# Patient Record
Sex: MALE | Race: WHITE | Employment: UNEMPLOYED | URBAN - METROPOLITAN AREA
[De-identification: names, ages, dates, MRNs, and addresses within clinical notes are randomized per-mention and may not be internally consistent; named-entity substitution may affect disease eponyms.]

---

## 2017-01-10 ENCOUNTER — OFFICE VISIT (OUTPATIENT)
Dept: ORTHOPEDIC SURGERY | Age: 42
End: 2017-01-10

## 2017-01-10 VITALS
WEIGHT: 210 LBS | SYSTOLIC BLOOD PRESSURE: 107 MMHG | HEIGHT: 73 IN | DIASTOLIC BLOOD PRESSURE: 61 MMHG | BODY MASS INDEX: 27.83 KG/M2 | HEART RATE: 87 BPM

## 2017-01-10 DIAGNOSIS — M25.562 LEFT KNEE PAIN, UNSPECIFIED CHRONICITY: Primary | ICD-10-CM

## 2017-01-10 DIAGNOSIS — M23.42 LOOSE BODY IN KNEE, LEFT: ICD-10-CM

## 2017-01-10 PROBLEM — M23.40 LOOSE BODY IN KNEE: Status: ACTIVE | Noted: 2017-01-10

## 2017-01-10 PROCEDURE — 73562 X-RAY EXAM OF KNEE 3: CPT | Performed by: ORTHOPAEDIC SURGERY

## 2017-01-10 PROCEDURE — 99213 OFFICE O/P EST LOW 20 MIN: CPT | Performed by: ORTHOPAEDIC SURGERY

## 2017-01-10 RX ORDER — MORPHINE 10 MG/ML
TINCTURE ORAL
COMMUNITY
Start: 2017-01-08 | End: 2017-04-24 | Stop reason: ALTCHOICE

## 2017-01-10 RX ORDER — OXYCODONE HYDROCHLORIDE 5 MG/1
10 TABLET ORAL EVERY 8 HOURS PRN
COMMUNITY
Start: 2016-12-14

## 2017-01-10 RX ORDER — LOPERAMIDE HYDROCHLORIDE 2 MG/1
CAPSULE ORAL
COMMUNITY
Start: 2016-12-21 | End: 2017-04-24 | Stop reason: ALTCHOICE

## 2017-01-10 RX ORDER — DIPHENOXYLATE HYDROCHLORIDE AND ATROPINE SULFATE 2.5; .025 MG/1; MG/1
TABLET ORAL PRN
COMMUNITY
Start: 2016-12-11 | End: 2019-08-06 | Stop reason: CLARIF

## 2017-01-10 RX ORDER — CODEINE SULFATE 30 MG/1
TABLET ORAL
COMMUNITY
Start: 2016-12-24 | End: 2017-04-24 | Stop reason: ALTCHOICE

## 2017-03-23 ENCOUNTER — OFFICE VISIT (OUTPATIENT)
Dept: ORTHOPEDIC SURGERY | Age: 42
End: 2017-03-23

## 2017-03-23 VITALS
DIASTOLIC BLOOD PRESSURE: 64 MMHG | BODY MASS INDEX: 27.17 KG/M2 | WEIGHT: 205 LBS | HEIGHT: 73 IN | SYSTOLIC BLOOD PRESSURE: 94 MMHG | HEART RATE: 69 BPM

## 2017-03-23 DIAGNOSIS — M23.42 LOOSE BODY IN KNEE, LEFT: Primary | ICD-10-CM

## 2017-03-23 DIAGNOSIS — M25.562 LEFT KNEE PAIN, UNSPECIFIED CHRONICITY: ICD-10-CM

## 2017-03-23 PROCEDURE — 1036F TOBACCO NON-USER: CPT | Performed by: ORTHOPAEDIC SURGERY

## 2017-03-23 PROCEDURE — G8484 FLU IMMUNIZE NO ADMIN: HCPCS | Performed by: ORTHOPAEDIC SURGERY

## 2017-03-23 PROCEDURE — 99213 OFFICE O/P EST LOW 20 MIN: CPT | Performed by: ORTHOPAEDIC SURGERY

## 2017-03-23 PROCEDURE — G8419 CALC BMI OUT NRM PARAM NOF/U: HCPCS | Performed by: ORTHOPAEDIC SURGERY

## 2017-03-23 PROCEDURE — G8428 CUR MEDS NOT DOCUMENT: HCPCS | Performed by: ORTHOPAEDIC SURGERY

## 2017-03-24 DIAGNOSIS — M23.42 LOOSE BODY OF LEFT KNEE: Primary | ICD-10-CM

## 2017-03-27 ENCOUNTER — TELEPHONE (OUTPATIENT)
Dept: ORTHOPEDIC SURGERY | Age: 42
End: 2017-03-27

## 2017-04-06 ENCOUNTER — HOSPITAL ENCOUNTER (OUTPATIENT)
Dept: PREADMISSION TESTING | Age: 42
Discharge: HOME | End: 2017-04-29
Attending: ORTHOPAEDIC SURGERY | Admitting: ORTHOPAEDIC SURGERY

## 2017-04-11 ENCOUNTER — TELEPHONE (OUTPATIENT)
Dept: ORTHOPEDIC SURGERY | Age: 42
End: 2017-04-11

## 2017-04-24 ENCOUNTER — HOSPITAL ENCOUNTER (OUTPATIENT)
Dept: PREADMISSION TESTING | Age: 42
Discharge: HOME | End: 2017-04-24
Attending: ORTHOPAEDIC SURGERY | Admitting: ORTHOPAEDIC SURGERY

## 2017-04-24 VITALS — BODY MASS INDEX: 26.51 KG/M2 | WEIGHT: 200 LBS | HEIGHT: 73 IN

## 2017-04-24 RX ORDER — CLINDAMYCIN PHOSPHATE 900 MG/50ML
900 INJECTION INTRAVENOUS ONCE
Status: CANCELLED | OUTPATIENT
Start: 2017-04-24 | End: 2017-04-24

## 2017-04-24 RX ORDER — CHLORHEXIDINE GLUCONATE 0.12 MG/ML
15 RINSE ORAL 2 TIMES DAILY
Status: CANCELLED | OUTPATIENT
Start: 2017-04-24

## 2017-04-27 ENCOUNTER — OFFICE VISIT (OUTPATIENT)
Dept: ORTHOPEDIC SURGERY | Age: 42
End: 2017-04-27

## 2017-04-27 ENCOUNTER — HOSPITAL ENCOUNTER (OUTPATIENT)
Dept: PHYSICAL THERAPY | Age: 42
Discharge: HOME | End: 2017-04-30
Admitting: ORTHOPAEDIC SURGERY

## 2017-04-27 VITALS
SYSTOLIC BLOOD PRESSURE: 95 MMHG | HEART RATE: 75 BPM | WEIGHT: 200 LBS | DIASTOLIC BLOOD PRESSURE: 68 MMHG | HEIGHT: 73 IN | BODY MASS INDEX: 26.51 KG/M2

## 2017-04-27 DIAGNOSIS — M23.42 LOOSE BODY IN KNEE, LEFT: ICD-10-CM

## 2017-04-27 DIAGNOSIS — M25.562 LEFT KNEE PAIN, UNSPECIFIED CHRONICITY: Primary | ICD-10-CM

## 2017-04-27 PROCEDURE — PREOPEXAM PRE-OP EXAM: Performed by: ORTHOPAEDIC SURGERY

## 2017-04-27 PROCEDURE — MISC250 COMPRESSION STOCKING: Performed by: ORTHOPAEDIC SURGERY

## 2017-05-01 ENCOUNTER — HOSPITAL ENCOUNTER (OUTPATIENT)
Dept: SURGERY | Age: 42
Discharge: HOME | End: 2017-05-01
Admitting: ORTHOPAEDIC SURGERY

## 2017-05-01 VITALS
SYSTOLIC BLOOD PRESSURE: 114 MMHG | DIASTOLIC BLOOD PRESSURE: 70 MMHG | HEIGHT: 73 IN | HEART RATE: 100 BPM | TEMPERATURE: 98.3 F | OXYGEN SATURATION: 94 % | BODY MASS INDEX: 26.51 KG/M2 | WEIGHT: 200 LBS | RESPIRATION RATE: 18 BRPM

## 2017-05-01 RX ORDER — ONDANSETRON 2 MG/ML
4 INJECTION INTRAMUSCULAR; INTRAVENOUS
Status: ACTIVE | OUTPATIENT
Start: 2017-05-01 | End: 2017-05-01

## 2017-05-01 RX ORDER — OXYCODONE HYDROCHLORIDE AND ACETAMINOPHEN 5; 325 MG/1; MG/1
1 TABLET ORAL ONCE
Status: DISCONTINUED | OUTPATIENT
Start: 2017-05-01 | End: 2017-05-02 | Stop reason: HOSPADM

## 2017-05-01 RX ORDER — SODIUM CHLORIDE, SODIUM LACTATE, POTASSIUM CHLORIDE, CALCIUM CHLORIDE 600; 310; 30; 20 MG/100ML; MG/100ML; MG/100ML; MG/100ML
INJECTION, SOLUTION INTRAVENOUS CONTINUOUS
Status: DISCONTINUED | OUTPATIENT
Start: 2017-05-01 | End: 2017-05-02 | Stop reason: HOSPADM

## 2017-05-01 RX ORDER — HYDRALAZINE HYDROCHLORIDE 20 MG/ML
5 INJECTION INTRAMUSCULAR; INTRAVENOUS EVERY 10 MIN PRN
Status: DISCONTINUED | OUTPATIENT
Start: 2017-05-01 | End: 2017-05-02 | Stop reason: HOSPADM

## 2017-05-01 RX ORDER — CLINDAMYCIN PHOSPHATE 900 MG/50ML
900 INJECTION INTRAVENOUS ONCE
Status: COMPLETED | OUTPATIENT
Start: 2017-05-01 | End: 2017-05-01

## 2017-05-01 RX ORDER — FENTANYL CITRATE 50 UG/ML
25 INJECTION, SOLUTION INTRAMUSCULAR; INTRAVENOUS EVERY 5 MIN PRN
Status: DISCONTINUED | OUTPATIENT
Start: 2017-05-01 | End: 2017-05-02 | Stop reason: HOSPADM

## 2017-05-01 RX ORDER — GLYCOPYRROLATE 0.2 MG/ML
0.1 INJECTION INTRAMUSCULAR; INTRAVENOUS ONCE
Status: COMPLETED | OUTPATIENT
Start: 2017-05-01 | End: 2017-05-01

## 2017-05-01 RX ORDER — MEPERIDINE HYDROCHLORIDE 25 MG/ML
12.5 INJECTION INTRAMUSCULAR; INTRAVENOUS; SUBCUTANEOUS EVERY 5 MIN PRN
Status: DISCONTINUED | OUTPATIENT
Start: 2017-05-01 | End: 2017-05-02 | Stop reason: HOSPADM

## 2017-05-01 RX ORDER — CHLORHEXIDINE GLUCONATE 0.12 MG/ML
15 RINSE ORAL 2 TIMES DAILY
Status: DISCONTINUED | OUTPATIENT
Start: 2017-05-01 | End: 2017-05-02 | Stop reason: HOSPADM

## 2017-05-01 RX ORDER — DIPHENHYDRAMINE HYDROCHLORIDE 50 MG/ML
12.5 INJECTION INTRAMUSCULAR; INTRAVENOUS
Status: ACTIVE | OUTPATIENT
Start: 2017-05-01 | End: 2017-05-01

## 2017-05-01 RX ORDER — LABETALOL HYDROCHLORIDE 5 MG/ML
5 INJECTION, SOLUTION INTRAVENOUS EVERY 10 MIN PRN
Status: DISCONTINUED | OUTPATIENT
Start: 2017-05-01 | End: 2017-05-02 | Stop reason: HOSPADM

## 2017-05-01 RX ADMIN — Medication 0.5 MG: at 09:20

## 2017-05-01 RX ADMIN — CLINDAMYCIN PHOSPHATE 900 MG: 900 INJECTION INTRAVENOUS at 07:35

## 2017-05-01 RX ADMIN — Medication 0.5 MG: at 10:05

## 2017-05-01 RX ADMIN — Medication 0.5 MG: at 09:40

## 2017-05-01 RX ADMIN — SODIUM CHLORIDE, SODIUM LACTATE, POTASSIUM CHLORIDE, CALCIUM CHLORIDE: 600; 310; 30; 20 INJECTION, SOLUTION INTRAVENOUS at 07:09

## 2017-05-01 RX ADMIN — Medication 0.5 MG: at 09:50

## 2017-05-01 RX ADMIN — Medication 0.5 MG: at 09:30

## 2017-05-01 RX ADMIN — FENTANYL CITRATE 25 MCG: 50 INJECTION, SOLUTION INTRAMUSCULAR; INTRAVENOUS at 10:00

## 2017-05-01 RX ADMIN — GLYCOPYRROLATE 0.1 MG: 0.2 INJECTION INTRAMUSCULAR; INTRAVENOUS at 07:16

## 2017-05-01 ASSESSMENT — PAIN DESCRIPTION - ORIENTATION
ORIENTATION: LEFT

## 2017-05-01 ASSESSMENT — PAIN DESCRIPTION - LOCATION
LOCATION: KNEE

## 2017-05-01 ASSESSMENT — PAIN SCALES - GENERAL
PAINLEVEL_OUTOF10: 4
PAINLEVEL_OUTOF10: 7
PAINLEVEL_OUTOF10: 6
PAINLEVEL_OUTOF10: 7
PAINLEVEL_OUTOF10: 6
PAINLEVEL_OUTOF10: 6
PAINLEVEL_OUTOF10: 7

## 2017-05-01 ASSESSMENT — PAIN DESCRIPTION - PROGRESSION
CLINICAL_PROGRESSION: GRADUALLY IMPROVING
CLINICAL_PROGRESSION: NOT CHANGED
CLINICAL_PROGRESSION: GRADUALLY IMPROVING

## 2017-05-01 ASSESSMENT — PAIN DESCRIPTION - PAIN TYPE
TYPE: SURGICAL PAIN

## 2017-05-01 ASSESSMENT — PAIN DESCRIPTION - DESCRIPTORS
DESCRIPTORS: PRESSURE
DESCRIPTORS: PRESSURE
DESCRIPTORS: SHARP
DESCRIPTORS: THROBBING

## 2017-05-01 ASSESSMENT — PAIN - FUNCTIONAL ASSESSMENT: PAIN_FUNCTIONAL_ASSESSMENT: 0-10

## 2017-05-02 ENCOUNTER — HOSPITAL ENCOUNTER (OUTPATIENT)
Dept: PHYSICAL THERAPY | Age: 42
Discharge: HOME | End: 2017-05-02
Admitting: ORTHOPAEDIC SURGERY

## 2017-05-08 ENCOUNTER — HOSPITAL ENCOUNTER (OUTPATIENT)
Dept: PHYSICAL THERAPY | Age: 42
Discharge: HOME | End: 2017-05-08
Admitting: ORTHOPAEDIC SURGERY

## 2017-05-10 ENCOUNTER — HOSPITAL ENCOUNTER (OUTPATIENT)
Dept: PHYSICAL THERAPY | Age: 42
Discharge: HOME | End: 2017-05-10
Admitting: ORTHOPAEDIC SURGERY

## 2017-05-25 ENCOUNTER — HOSPITAL ENCOUNTER (OUTPATIENT)
Dept: PHYSICAL THERAPY | Age: 42
Discharge: HOME | End: 2017-05-25
Admitting: ORTHOPAEDIC SURGERY

## 2017-06-01 ENCOUNTER — HOSPITAL ENCOUNTER (OUTPATIENT)
Dept: PHYSICAL THERAPY | Age: 42
Discharge: HOME | End: 2017-06-01
Admitting: ORTHOPAEDIC SURGERY

## 2017-08-29 ENCOUNTER — HOSPITAL ENCOUNTER (OUTPATIENT)
Dept: PHYSICAL THERAPY | Age: 42
Discharge: HOME | End: 2017-08-31
Admitting: ORTHOPAEDIC SURGERY

## 2018-01-25 ENCOUNTER — OFFICE VISIT (OUTPATIENT)
Dept: ORTHOPEDIC SURGERY | Age: 43
End: 2018-01-25

## 2018-01-25 VITALS
DIASTOLIC BLOOD PRESSURE: 71 MMHG | SYSTOLIC BLOOD PRESSURE: 98 MMHG | BODY MASS INDEX: 26.5 KG/M2 | WEIGHT: 199.96 LBS | HEART RATE: 98 BPM | HEIGHT: 73 IN

## 2018-01-25 DIAGNOSIS — M17.12 PRIMARY OSTEOARTHRITIS OF LEFT KNEE: ICD-10-CM

## 2018-01-25 DIAGNOSIS — M25.562 LEFT KNEE PAIN, UNSPECIFIED CHRONICITY: Primary | ICD-10-CM

## 2018-01-25 DIAGNOSIS — Z96.651 HISTORY OF TOTAL KNEE ARTHROPLASTY, RIGHT: ICD-10-CM

## 2018-01-25 PROCEDURE — G8427 DOCREV CUR MEDS BY ELIG CLIN: HCPCS | Performed by: ORTHOPAEDIC SURGERY

## 2018-01-25 PROCEDURE — G8419 CALC BMI OUT NRM PARAM NOF/U: HCPCS | Performed by: ORTHOPAEDIC SURGERY

## 2018-01-25 PROCEDURE — G8484 FLU IMMUNIZE NO ADMIN: HCPCS | Performed by: ORTHOPAEDIC SURGERY

## 2018-01-25 PROCEDURE — 1036F TOBACCO NON-USER: CPT | Performed by: ORTHOPAEDIC SURGERY

## 2018-01-25 PROCEDURE — 99213 OFFICE O/P EST LOW 20 MIN: CPT | Performed by: ORTHOPAEDIC SURGERY

## 2018-01-25 NOTE — PROGRESS NOTES
family history on file. Social History     Social History    Marital status: Single     Spouse name: N/A    Number of children: N/A    Years of education: N/A     Social History Main Topics    Smoking status: Never Smoker    Smokeless tobacco: Never Used    Alcohol use No    Drug use: No    Sexual activity: Not Asked     Other Topics Concern    None     Social History Narrative    None       Current Outpatient Prescriptions   Medication Sig Dispense Refill    oxyCODONE (ROXICODONE) 5 MG immediate release tablet 10 mg every 8 hours as needed  .  Pantoprazole Sodium (PROTONIX PO) Take 20 mg by mouth every 7 days       sucralfate (CARAFATE) 1 GM tablet daily       levothyroxine (SYNTHROID) 25 MCG tablet 25 mcg daily       dicyclomine (BENTYL) 20 MG tablet Take 20 mg by mouth every 6 hours.  ondansetron (ZOFRAN) 4 MG tablet Take 4 mg by mouth every 8 hours as needed.  cyanocobalamin 1000 MCG/ML injection Inject  into the muscle every 30 days.  citalopram (CELEXA) 20 MG tablet Take 20 mg by mouth daily.  diphenoxylate-atropine (LOMOTIL) 2.5-0.025 MG per tablet as needed       Buprenorphine (BUTRANS TD) Place 10 mcg onto the skin every 7 days        No current facility-administered medications for this visit. Allergies   Allergen Reactions    Remicade [Infliximab Injection] Shortness Of Breath    Biaxin [Clarithromycin] Itching    Cephalexin Itching    Darvocet [Propoxyphene N-Acetaminophen] Itching    Droperidol      Causes panick attacks    Levofloxacin Itching    Reglan [Metoclopramide] Itching       Review of Systems:  A 14 point review of systems was completed by the patient and is available in the media section of the scanned medical record and was reviewed on 1/25/2018.   The review is negative with the exception of those things mentioned in the HPI and Past Medical History     Vital Signs:   BP 98/71   Pulse 98   Ht 6' 0.99\" (1.854 m)   Wt 199 lb 15.3 oz

## 2018-01-29 ENCOUNTER — HOSPITAL ENCOUNTER (OUTPATIENT)
Dept: PHYSICAL THERAPY | Age: 43
Discharge: HOME | End: 2018-01-31
Admitting: ORTHOPAEDIC SURGERY

## 2018-01-29 DIAGNOSIS — M25.562 LEFT KNEE PAIN, UNSPECIFIED CHRONICITY: Primary | ICD-10-CM

## 2018-01-29 NOTE — FLOWSHEET NOTE
[facility] [place] [facility]  Orthopaedics and Sports Rehabilitation, Fariba Lawson    Physical Therapy Daily Treatment Note  Date:  2018    Patient Name:  Audrey Osborne    :  1975  MRN: [id number]  Restrictions/Precautions:    Medical/Treatment Diagnosis Information:  Diagnosis: M25.562 (ICD-10-CM) - Left knee pain, unspecified chronicity  Treatment Diagnosis: Patient presents s/s consistent with MD diagnosis. Patient presents with decreased hamstring, ITB and quadriceps flexibility; decreased hip ABd and extensor strength; instability within the R knee; impaired gait; decreased functional mobility. Insurance/Certification information:  PT Insurance Information: PT BENEFITS 2018 FACILITY/ MEDICARE PRIMARY/ 2,010 CAP FOR 2018/ [place] MEDICAID SECONDARY INS/ 18 PAG  Physician Information:  Referring Practitioner: Sherry Rojo  Plan of care signed (Y/N): Pending     Date of Patient follow up with Physician:     G-Code (if applicable):      Date G-Code Applied:  18  PT G-Codes  Functional Assessment Tool Used: LEFS  Score: 30% Deficit  Functional Limitation: Mobility: Walking and moving around  Mobility: Walking and Moving Around Current Status (): At least 20 percent but less than 40 percent impaired, limited or restricted  Mobility: Walking and Moving Around Goal Status ():  At least 1 percent but less than 20 percent impaired, limited or restricted    Progress Note: [x]  Yes  []  No  Next due by: Visit #10       Latex Allergy:  [x]NO      []YES  Preferred Language for Healthcare:   [x]English       []other:    Visit # Insurance Allowable   1 $2010 cap     Pain level:  3-7/10     SUBJECTIVE:  See eval    OBJECTIVE:       Flexibility L R Comment   Hamstring -20 deg -30 deg 90/90   Gastroc NT NT     ITB Mild Mod Rafat's   Quad Mild Mild Ely's                           ROM PROM AROM Overpressure Comment     L R L R L R     Flexion     135 130         Extension     0 0                               x 10 Blue        PRE     Extension  RANGE:   Flexion  RANGE:        Quantum machines     Leg press      Leg extension     Leg curl          Manual interventions                     Therapeutic Exercise and NMR EXR  [x] (79909) Provided verbal/tactile cueing for activities related to strengthening, flexibility, endurance, ROM for improvements in LE, proximal hip, and core control with self care, mobility, lifting, ambulation. [x] (12917) Provided verbal/tactile cueing for activities related to improving balance, coordination, kinesthetic sense, posture, motor skill, proprioception  to assist with LE, proximal hip, and core control in self care, mobility, lifting, ambulation and eccentric single leg control.      NMR and Therapeutic Activities:    [] (04419 or 62926) Provided verbal/tactile cueing for activities related to improving balance, coordination, kinesthetic sense, posture, motor skill, proprioception and motor activation to allow for proper function of core, proximal hip and LE with self care and ADLs  [] (97143) Gait Re-education- Provided training and instruction to the patient for proper LE, core and proximal hip recruitment and positioning and eccentric body weight control with ambulation re-education including up and down stairs     Home Exercise Program:    [x] (33785) Reviewed/Progressed HEP activities related to strengthening, flexibility, endurance, ROM of core, proximal hip and LE for functional self-care, mobility, lifting and ambulation/stair navigation   [] (78734)Reviewed/Progressed HEP activities related to improving balance, coordination, kinesthetic sense, posture, motor skill, proprioception of core, proximal hip and LE for self care, mobility, lifting, and ambulation/stair navigation      Manual Treatments:  PROM / STM / Oscillations-Mobs:  G-I, II, III, IV (PA's, Inf., Post.)  [] (48480) Provided manual therapy to mobilize LE, proximal hip and/or LS spine soft tissue/joints for the purpose of modulating pain, promoting relaxation,  increasing ROM, reducing/eliminating soft tissue swelling/inflammation/restriction, improving soft tissue extensibility and allowing for proper ROM for normal function with self care, mobility, lifting and ambulation. Modalities:  Declined ice    Charges:  Timed Code Treatment Minutes: 39'   Total Treatment Minutes: 76'       [x] EVAL (LOW) 30277 (typically 20 minutes face-to-face)  [] EVAL (MOD) 66634 (typically 30 minutes face-to-face)  [] EVAL (HIGH) 58527 (typically 45 minutes face-to-face)  [] RE-EVAL   [x] RL(86179) x  1   [] IONTO  [x] NMR (50581) x  1   [] VASO  [] Manual (12325) x       [] Other:  [x] TA x  1    [] Mech Traction (88275)  [] ES(attended) (47498)      [] ES (un) (12393):     GOALS:  Patient stated goal: Patient would like to return to improve endurance and overall strength     Therapist goals for Patient:   Short Term Goals: To be achieved in: 2 weeks  1. Independent in HEP and progression per patient tolerance, in order to prevent re-injury. 2. Patient will have a decrease in pain to facilitate improvement in movement, function, and ADLs as indicated by Functional Deficits.     Long Term Goals: To be achieved in: 12 weeks  1. Disability index score of 15% or less for the LEFS to assist with reaching prior level of function. 2. Patient will demonstrate increased knee flexion AROM to 135 to allow for proper joint functioning as indicated by patients Functional Deficits. 3. Patient will demonstrate an increase in Strength to good proximal hip strength and control in LE to allow for proper functional mobility as indicated by patients Functional Deficits. 4. Patient will return to household functional activities without increased symptoms or restriction. 5. Patient will walk for 20 minutes without pain in the R knee. Progression Towards Functional goals:  [] Patient is progressing as expected towards functional goals listed. [] Progression is slowed due to complexities listed. [] Progression has been slowed due to co-morbidities. [x] Plan just implemented, too soon to assess goals progression  [] Other:     ASSESSMENT:  See eval    Treatment/Activity Tolerance:  [x] Patient tolerated treatment well [] Patient limited by fatique  [] Patient limited by pain  [] Patient limited by other medical complications  [] Other:     Patient education:  1/29/18: Implemented HEP    Prognosis: [] Good [x] Fair  [] Poor    Patient Requires Follow-up: [x] Yes  [] No    PLAN: See eval  [] Continue per plan of care [] Alter current plan (see comments)  [x] Plan of care initiated [] Hold pending MD visit [] Discharge    Electronically signed by: Allie Shipman PT, DPT  Therapist was present, directed the patient's care, made skilled judgement, and was responsible for assessment and treatment of the patient.    Suri Puentes, SPT

## 2018-01-29 NOTE — PLAN OF CARE
[facility]                                                       Physical Therapy Certification    Dear Referring Practitioner: Nae Cloud,    We had the pleasure of evaluating the following patient for physical therapy services at [address] [place] [address]. A summary of our findings can be found in the initial assessment below. This includes our plan of care. If you have any questions or concerns regarding these findings, please do not hesitate to contact me at the office phone number checked above. Thank you for the referral.       Physician Signature:_______________________________Date:__________________  By signing above (or electronic signature), therapists plan is approved by physician      Patient: Jeanmarie Tena   : 1975   MRN: [id number]  Referring Physician: Referring Practitioner: Nae Cloud      Evaluation Date: 2018      Medical Diagnosis Information:  Diagnosis: M25.562 (ICD-10-CM) - Left knee pain, unspecified chronicity   Treatment Diagnosis: Patient presents s/s consistent with MD diagnosis. Patient presents with decreased hamstring, ITB and quadriceps flexibility; decreased hip ABd and extensor strength; instability within the R knee; impaired gait; decreased functional mobility. Insurance information: PT Insurance Information: PT BENEFITS 2018 FACILITY/ MEDICARE PRIMARY/ 2,010 CAP FOR 2018/ [place] MEDICAID SECONDARY INS/ 18 PAG     Precautions/ Contra-indications:   Latex Allergy:  [x]NO      []YES  Preferred Language for Healthcare:   [x]English       []other:    SUBJECTIVE: Patient had a TKR in his R knee, in . Patient has recently received multiple scopes, for debridement within his L knee. He received therapy earlier in  following his last procedure, at this PT office.  However he was unable to hypomobility   []Decreased LE functional ROM   [x]Decreased core/proximal hip strength and neuromuscular control   [x]Decreased LE functional strength   [x]Reduced balance/proprioceptive control   []other:      Functional Activity Limitations (from functional questionnaire and intake)   [x]Reduced ability to tolerate prolonged functional positions   []Reduced ability or difficulty with changes of positions or transfers between positions   [x]Reduced ability to maintain good posture and demonstrate good body mechanics with sitting, bending, and lifting   []Reduced ability to sleep   [] Reduced ability or tolerance with driving and/or computer work   [x]Reduced ability to perform lifting, carrying tasks   [x]Reduced ability to squat   [x]Reduced ability to forward bend   [x]Reduced ability to ambulate prolonged functional periods/distances/surfaces   [x]Reduced ability to ascend/descend stairs   [x]Reduced ability to run, hop or jump   []other:     Participation Restrictions   [x]Reduced participation in self care activities   [x]Reduced participation in home management activities   []Reduced participation in work activities   []Reduced participation in social activities. [x]Reduced participation in sport activities. Classification :    [x]Signs/symptoms consistent with post-surgical status including decreased ROM, strength and function.    []Signs/symptoms consistent with joint sprain/strain   []Signs/symptoms consistent with patella-femoral syndrome   []Signs/symptoms consistent with knee OA/hip OA   []Signs/symptoms consistent with internal derangement of knee/Hip   []Signs/symptoms consistent with functional hip weakness/NMR control      []Signs/symptoms consistent with tendinitis/tendinosis    []signs/symptoms consistent with pathology which may benefit from Dry needling      []other:      Prognosis/Rehab Potential:      []Excellent   []Good    [x]Fair   []Poor    Tolerance of evaluation/treatment: []Excellent   []Good    [x]Fair   []Poor    Physical Therapy Evaluation Complexity Justification  [x] A history of present problem with:  [] no personal factors and/or comorbidities that impact the plan of care;  [x]1-2 personal factors and/or comorbidities that impact the plan of care  []3 personal factors and/or comorbidities that impact the plan of care  [x] An examination of body systems using standardized tests and measures addressing any of the following: body structures and functions (impairments), activity limitations, and/or participation restrictions;:  [] a total of 1-2 or more elements   [x] a total of 3 or more elements   [] a total of 4 or more elements   [x] A clinical presentation with:  [x] stable and/or uncomplicated characteristics   [] evolving clinical presentation with changing characteristics  [] unstable and unpredictable characteristics;   [x] Clinical decision making of [x] low, [] moderate, [] high complexity using standardized patient assessment instrument and/or measurable assessment of functional outcome. [x] EVAL (LOW) 67650 (typically 20 minutes face-to-face)  [] EVAL (MOD) 49950 (typically 30 minutes face-to-face)  [] EVAL (HIGH) 96329 (typically 45 minutes face-to-face)  [] RE-EVAL       PLAN  Frequency/Duration:  2 days per week for 12 Weeks:  Interventions:  [x]  Therapeutic exercise including: strength training, ROM, for Lower extremity and core   [x]  NMR activation and proprioception for LE, Glutes and Core   [x]  Manual therapy as indicated for LE, Hip and spine to include: Dry Needling/IASTM, STM, PROM, Gr I-IV mobilizations, manipulation. [x] Modalities as needed that may include: thermal agents, E-stim, Biofeedback, US, iontophoresis as indicated  [x] Patient education on joint protection, postural re-education, activity modification, progression of HEP.     HEP instruction: (see scanned forms)    GOALS:  Patient stated goal: Patient would like to return to improve

## 2018-02-01 ENCOUNTER — HOSPITAL ENCOUNTER (OUTPATIENT)
Dept: PHYSICAL THERAPY | Age: 43
Discharge: HOME | End: 2018-02-28
Attending: ORTHOPAEDIC SURGERY | Admitting: ORTHOPAEDIC SURGERY

## 2018-02-09 ENCOUNTER — HOSPITAL ENCOUNTER (OUTPATIENT)
Dept: PHYSICAL THERAPY | Age: 43
Discharge: HOME | End: 2018-02-10
Admitting: ORTHOPAEDIC SURGERY

## 2018-02-09 NOTE — FLOWSHEET NOTE
[facility]  Orthopaedics and Sports Rehabilitation, Driscilla Ormond    Physical Therapy Daily Treatment Note  Date:  2018    Patient Name:  Dodson Romberg    :  1975  MRN: [id number]  Restrictions/Precautions:    Medical/Treatment Diagnosis Information:  Diagnosis: M25.562 (ICD-10-CM) - Left knee pain, unspecified chronicity  Treatment Diagnosis: Patient presents s/s consistent with MD diagnosis. Patient presents with decreased hamstring, ITB and quadriceps flexibility; decreased hip ABd and extensor strength; instability within the R knee; impaired gait; decreased functional mobility. Insurance/Certification information:  PT Insurance Information: PT BENEFITS 2018 FACILITY/ MEDICARE PRIMARY/ 2,010 CAP FOR 2018/ [place] MEDICAID SECONDARY [place]/ 18 PAG  Physician Information:  Referring Practitioner: Inge Wong  Plan of care signed (Y/N): Pending     Date of Patient follow up with Physician:     G-Code (if applicable):      Date G-Code Applied:  18       Progress Note: [x]  Yes  []  No  Next due by: Visit #10       Latex Allergy:  [x]NO      []YES  Preferred Language for Healthcare:   [x]English       []other:    Visit # Insurance Allowable   2 $2010 cap     Pain level:  3-7/10     SUBJECTIVE:  Patient stated he was sick all last week. He also noted he was unable to perform his HEP a couple days last week because he was too exhausted. He had no new complaints of knee pain. Pt noted he felt about the same compared to his last visit.      OBJECTIVE:       Flexibility L R Comment   Hamstring -20 deg -30 deg 90/90   Gastroc NT NT     ITB Mild Mod Rafat's   Quad Mild Mild Ely's                           ROM PROM AROM Overpressure Comment     L R L R L R     Flexion     135 130         Extension     0 0                                                   Strength L R Comment   Quad 5 5     Hamstring 5 5     Gastroc NT NT     Hip  flexion 4- 4-     Hip abd 4- 4-     Hip extension 3+ 3+             machines     Leg press      Leg extension     Leg curl          Manual interventions                     Therapeutic Exercise and NMR EXR  [x] (31439) Provided verbal/tactile cueing for activities related to strengthening, flexibility, endurance, ROM for improvements in LE, proximal hip, and core control with self care, mobility, lifting, ambulation. [x] (34447) Provided verbal/tactile cueing for activities related to improving balance, coordination, kinesthetic sense, posture, motor skill, proprioception  to assist with LE, proximal hip, and core control in self care, mobility, lifting, ambulation and eccentric single leg control.      NMR and Therapeutic Activities:    [] (87412 or 59616) Provided verbal/tactile cueing for activities related to improving balance, coordination, kinesthetic sense, posture, motor skill, proprioception and motor activation to allow for proper function of core, proximal hip and LE with self care and ADLs  [] (84225) Gait Re-education- Provided training and instruction to the patient for proper LE, core and proximal hip recruitment and positioning and eccentric body weight control with ambulation re-education including up and down stairs     Home Exercise Program:    [x] (83440) Reviewed/Progressed HEP activities related to strengthening, flexibility, endurance, ROM of core, proximal hip and LE for functional self-care, mobility, lifting and ambulation/stair navigation   [] (75471)Reviewed/Progressed HEP activities related to improving balance, coordination, kinesthetic sense, posture, motor skill, proprioception of core, proximal hip and LE for self care, mobility, lifting, and ambulation/stair navigation      Manual Treatments:  PROM / STM / Oscillations-Mobs:  G-I, II, III, IV (PA's, Inf., Post.)  [] (27881) Provided manual therapy to mobilize LE, proximal hip and/or LS spine soft tissue/joints for the purpose of modulating pain, promoting relaxation,  increasing ROM,

## 2018-02-16 ENCOUNTER — HOSPITAL ENCOUNTER (OUTPATIENT)
Dept: PHYSICAL THERAPY | Age: 43
Discharge: HOME | End: 2018-02-17
Admitting: ORTHOPAEDIC SURGERY

## 2018-02-16 NOTE — FLOWSHEET NOTE
functional goals listed. [] Progression is slowed due to complexities listed. [] Progression has been slowed due to co-morbidities. [x] Plan just implemented, too soon to assess goals progression  [] Other:     ASSESSMENT: Despite slight progression in pt's treatment program, pt was significantly fatigued. After on the bike, pt noted a decrease in stiffness in the anterior portion of his knee, near the patellar tendon. He struggles to maintain mini squat position during side-steps and monster walks d/t weak gluteal mm. Continues to require cueing during these exercises to avoid dynamic valgus, but form is improving. Pt tended to WS to the R side when perform sit-to-stands. With visual and verbal cueing, form improved. Will hopefully look to further progress his treatment program to address his LE strength, dynamic/static balance and functional mobility deficits. Treatment/Activity Tolerance:  [] Patient tolerated treatment well [x] Patient limited by fatique  [] Patient limited by pain  [] Patient limited by other medical complications  [] Other:     Patient education:  1/29/18: Implemented HEP  2/9/18: Updated HEP    Prognosis: [] Good [x] Fair  [] Poor    Patient Requires Follow-up: [x] Yes  [] No    PLAN: Weight machines, balance progression  [] Continue per plan of care [] Alter current plan (see comments)  [x] Plan of care initiated [] Hold pending MD visit [] Discharge    Electronically signed by: Ilana Nicolas PT, DPT  Therapist was present, directed the patient's care, made skilled judgement, and was responsible for assessment and treatment of the patient.    Winston Kong, SPT

## 2018-02-23 ENCOUNTER — HOSPITAL ENCOUNTER (OUTPATIENT)
Dept: PHYSICAL THERAPY | Age: 43
Discharge: HOME | End: 2018-02-24
Admitting: ORTHOPAEDIC SURGERY

## 2018-02-23 NOTE — FLOWSHEET NOTE
[facility]  Orthopaedics and Sports Rehabilitation, [facility]    Physical Therapy Daily Treatment Note  Date:  2018    Patient Name:  Audrey Osborne    :  1975  MRN: [id number]  Restrictions/Precautions:    Medical/Treatment Diagnosis Information:  Diagnosis: M25.562 (ICD-10-CM) - Left knee pain, unspecified chronicity  Treatment Diagnosis: Patient presents s/s consistent with MD diagnosis. Patient presents with decreased hamstring, ITB and quadriceps flexibility; decreased hip ABd and extensor strength; instability within the R knee; impaired gait; decreased functional mobility. Insurance/Certification information:  PT Insurance Information: PT BENEFITS 2018 FACILITY/ MEDICARE PRIMARY/ 2,010 CAP FOR 2018/ [place] MEDICAID SECONDARY INS/ 18 PAG  Physician Information:  Referring Practitioner: Sherry Rojo  Plan of care signed (Y/N): Yes    Date of Patient follow up with Physician:     G-Code (if applicable):      Date G-Code Applied:  18       Progress Note: [x]  Yes  []  No  Next due by: Visit #10       Latex Allergy:  [x]NO      []YES  Preferred Language for Healthcare:   [x]English       []other:    Visit # Insurance Allowable   4 $2010 cap     Pain level:  3-7/10     SUBJECTIVE:  Pt has been doing well. Slipped getting out of the shower, causing increased knee soreness, so he cancelled last session. Back to normal as of today. Has been doing exercises consistently without any issues. No c/o pain today.      OBJECTIVE:       Flexibility L R Comment   Hamstring -20 deg -30 deg 90/90   Gastroc NT NT     ITB Mild Mod Rafat's   Quad Mild Mild Ely's                           ROM PROM AROM Overpressure Comment     L R L R L R     Flexion     135 130         Extension     0 0                                                   Strength L R Comment   Quad 5 5     Hamstring 5 5     Gastroc NT NT     Hip  flexion 4- 4-     Hip abd 4- 4-     Hip extension 3+ 3+                     Special Test LS spine soft tissue/joints for the purpose of modulating pain, promoting relaxation,  increasing ROM, reducing/eliminating soft tissue swelling/inflammation/restriction, improving soft tissue extensibility and allowing for proper ROM for normal function with self care, mobility, lifting and ambulation. Modalities:  Ice 15'    Charges:  Timed Code Treatment Minutes: 61'   Total Treatment Minutes: 76'       [] EVAL (LOW) 67849 (typically 20 minutes face-to-face)  [] EVAL (MOD) 48159 (typically 30 minutes face-to-face)  [] EVAL (HIGH) 75401 (typically 45 minutes face-to-face)  [] RE-EVAL   [x] TH(24648) x  2   [] IONTO  [x] NMR (52810) x  1   [] VASO  [] Manual (97182) x       [] Other:  [x] TA x  1    [] Mech Traction (38250)  [] ES(attended) (42177)      [] ES (un) (42294):     GOALS:  Patient stated goal: Patient would like to return to improve endurance and overall strength     Therapist goals for Patient:   Short Term Goals: To be achieved in: 2 weeks  1. Independent in HEP and progression per patient tolerance, in order to prevent re-injury. 2. Patient will have a decrease in pain to facilitate improvement in movement, function, and ADLs as indicated by Functional Deficits.     Long Term Goals: To be achieved in: 12 weeks  1. Disability index score of 15% or less for the LEFS to assist with reaching prior level of function. 2. Patient will demonstrate increased knee flexion AROM to 135 to allow for proper joint functioning as indicated by patients Functional Deficits. 3. Patient will demonstrate an increase in Strength to good proximal hip strength and control in LE to allow for proper functional mobility as indicated by patients Functional Deficits. 4. Patient will return to household functional activities without increased symptoms or restriction. 5. Patient will walk for 20 minutes without pain in the R knee.     Progression Towards Functional goals:  [] Patient is progressing as expected towards

## 2018-02-26 ENCOUNTER — HOSPITAL ENCOUNTER (OUTPATIENT)
Dept: PHYSICAL THERAPY | Age: 43
Discharge: HOME | End: 2018-02-27
Admitting: ORTHOPAEDIC SURGERY

## 2018-02-26 NOTE — FLOWSHEET NOTE
[facility]  Orthopaedics and Sports Rehabilitation, Kathrine [facility]o    Physical Therapy Daily Treatment Note  Date:  2018    Patient Name:  Terry Butler    :  1975  MRN: [id number]  Restrictions/Precautions:    Medical/Treatment Diagnosis Information:  Diagnosis: M25.562 (ICD-10-CM) - Left knee pain, unspecified chronicity  Treatment Diagnosis: Patient presents s/s consistent with MD diagnosis. Patient presents with decreased hamstring, ITB and quadriceps flexibility; decreased hip ABd and extensor strength; instability within the R knee; impaired gait; decreased functional mobility. Insurance/Certification information:  PT Insurance Information: PT BENEFITS 2018 FACILITY/ MEDICARE PRIMARY/ 2,010 CAP FOR 2018/ [place] MEDICA[place] SECOND[place]/ 18 PAG  Physician Information:  Referring Practitioner: Alessandra Larsen  Plan of care signed (Y/N): Yes    Date of Patient follow up with Physician:     G-Code (if applicable):      Date G-Code Applied:  18       Progress Note: [x]  Yes  []  No  Next due by: Visit #10       Latex Allergy:  [x]NO      []YES  Preferred Language for Healthcare:   [x]English       []other:    Visit # Insurance Allowable   5 $2010 cap     Pain level:  3-7/10     SUBJECTIVE:  Patient stated he is doing better today. He noted general mm soreness on Saturday this past weekend. S/s subsided the following day. Pt attributed this to possibly over-doing it on Friday. Patient is back to normal today. No c/o of pain prior to starting today's session.      OBJECTIVE:       Flexibility L R Comment   Hamstring -20 deg -30 deg 90/90   Gastroc NT NT     ITB Mild Mod Rafat's   Quad Mild Mild Ely's                           ROM PROM AROM Overpressure Comment     L R L R L R     Flexion     135 130         Extension     0 0                                                   Strength L R Comment   Quad 5 5     Hamstring 5 5     Gastroc NT NT     Hip  flexion 4- 4-     Hip abd 4- 4-     Hip Functional goals:  [] Patient is progressing as expected towards functional goals listed. [] Progression is slowed due to complexities listed. [] Progression has been slowed due to co-morbidities. [x] Plan just implemented, too soon to assess goals progression  [] Other:     ASSESSMENT: Pt was able to increase his repetitions t/o today's session without c/o of pain or discomfort in his knee. Patient was fatigued following his treatment. He was also easily fatigued following his balance activities. Because pt is significantly deconditioned, treatment was relatively similar. HEP was updated, patient seemed to understand, with any questions answered by therapist.     Treatment/Activity Tolerance:  [] Patient tolerated treatment well [x] Patient limited by fatique  [] Patient limited by pain  [] Patient limited by other medical complications  [] Other:     Patient education:  1/29/18: Implemented HEP  2/9/18: Updated HEP    Prognosis: [] Good [x] Fair  [] Poor    Patient Requires Follow-up: [x] Yes  [] No    PLAN:   [] Continue per plan of care [] Alter current plan (see comments)  [x] Plan of care initiated [] Hold pending MD visit [] Discharge    Electronically signed by: Jolanta Garcia PT, DPT  Therapist was present, directed the patient's care, made skilled judgement, and was responsible for assessment and treatment of the patient.       Chyna Joe, SPT

## 2018-03-01 ENCOUNTER — HOSPITAL ENCOUNTER (OUTPATIENT)
Dept: PHYSICAL THERAPY | Age: 43
Discharge: HOME | End: 2018-03-31
Attending: ORTHOPAEDIC SURGERY | Admitting: ORTHOPAEDIC SURGERY

## 2018-03-05 ENCOUNTER — HOSPITAL ENCOUNTER (OUTPATIENT)
Dept: PHYSICAL THERAPY | Age: 43
Discharge: HOME | End: 2018-03-06
Admitting: ORTHOPAEDIC SURGERY

## 2018-03-05 NOTE — FLOWSHEET NOTE
[facility]  Orthopaedics and Sports Rehabilitation, [facility]    Physical Therapy Daily Treatment Note  Date:  3/5/2018    Patient Name:  Leia Sewell    :  1975  MRN: [id number]  Restrictions/Precautions:    Medical/Treatment Diagnosis Information:  Diagnosis: M25.562 (ICD-10-CM) - Left knee pain, unspecified chronicity  Treatment Diagnosis: Patient presents s/s consistent with MD diagnosis. Patient presents with decreased hamstring, ITB and quadriceps flexibility; decreased hip ABd and extensor strength; instability within the R knee; impaired gait; decreased functional mobility. Insurance/Certification information:  PT Insurance Information: PT BENEFITS 2018 FACILITY/ MEDICARE PRIMARY/ 2,010 CAP FOR 2018/ [place]OND[place]/ 18 PAG  Physician Information:  Referring Practitioner: Chinmay Mccoy  Plan of care signed (Y/N): Yes    Date of Patient follow up with Physician:     G-Code (if applicable):      Date G-Code Applied:  18       Progress Note: [x]  Yes  []  No  Next due by: Visit #10       Latex Allergy:  [x]NO      []YES  Preferred Language for Healthcare:   [x]English       []other:    Visit # Insurance Allowable   6 $2010 cap     Pain level:  3-7/10     SUBJECTIVE: Pt noted he had to cancel his last appointment d/t an illness. Pt was unable to complete his HEP. Pt had no c/o's of pain or discomfort within the knee.      OBJECTIVE:       Flexibility L R Comment   Hamstring -20 deg -30 deg 90/90   Gastroc NT NT     ITB Mild Mod Rafat's   Quad Mild Mild Ely's                           ROM PROM AROM Overpressure Comment     L R L R L R     Flexion     135 130         Extension     0 0                                                   Strength L R Comment   Quad 5 5     Hamstring 5 5     Gastroc NT NT     Hip  flexion 4- 4-     Hip abd 4- 4-     Hip extension 3+ 3+                     Special Test Results/Comment   Meniscal Click -/-   Crepitus -/-   Flexion Test -/- Flexion  RANGE:        Quantum machines     Leg press  3 x 10 RANGE: 80-10, 105# DL   Leg extension 3 x 10 RANGE: 90-30, 40# DL   Leg curl 3 x 10 RANGE: 0-90, 35# DL        Manual interventions                     Therapeutic Exercise and NMR EXR  [x] (01541) Provided verbal/tactile cueing for activities related to strengthening, flexibility, endurance, ROM for improvements in LE, proximal hip, and core control with self care, mobility, lifting, ambulation. [x] (34502) Provided verbal/tactile cueing for activities related to improving balance, coordination, kinesthetic sense, posture, motor skill, proprioception  to assist with LE, proximal hip, and core control in self care, mobility, lifting, ambulation and eccentric single leg control.      NMR and Therapeutic Activities:    [] (21447 or 61436) Provided verbal/tactile cueing for activities related to improving balance, coordination, kinesthetic sense, posture, motor skill, proprioception and motor activation to allow for proper function of core, proximal hip and LE with self care and ADLs  [] (24208) Gait Re-education- Provided training and instruction to the patient for proper LE, core and proximal hip recruitment and positioning and eccentric body weight control with ambulation re-education including up and down stairs     Home Exercise Program:    [x] (14908) Reviewed/Progressed HEP activities related to strengthening, flexibility, endurance, ROM of core, proximal hip and LE for functional self-care, mobility, lifting and ambulation/stair navigation   [] (19138)Reviewed/Progressed HEP activities related to improving balance, coordination, kinesthetic sense, posture, motor skill, proprioception of core, proximal hip and LE for self care, mobility, lifting, and ambulation/stair navigation      Manual Treatments:  PROM / STM / Oscillations-Mobs:  G-I, II, III, IV (PA's, Inf., Post.)  [] (59995) Provided manual therapy to mobilize LE, proximal hip and/or LS

## 2018-04-01 ENCOUNTER — HOSPITAL ENCOUNTER (OUTPATIENT)
Dept: PHYSICAL THERAPY | Age: 43
Discharge: HOME | End: 2018-04-30
Attending: ORTHOPAEDIC SURGERY | Admitting: ORTHOPAEDIC SURGERY

## 2019-08-06 ENCOUNTER — OFFICE VISIT (OUTPATIENT)
Dept: ORTHOPEDIC SURGERY | Age: 44
End: 2019-08-06
Payer: COMMERCIAL

## 2019-08-06 VITALS
HEART RATE: 85 BPM | HEIGHT: 73 IN | DIASTOLIC BLOOD PRESSURE: 80 MMHG | BODY MASS INDEX: 31.81 KG/M2 | SYSTOLIC BLOOD PRESSURE: 130 MMHG | WEIGHT: 240 LBS

## 2019-08-06 DIAGNOSIS — M25.562 LEFT KNEE PAIN, UNSPECIFIED CHRONICITY: Primary | ICD-10-CM

## 2019-08-06 PROCEDURE — 99213 OFFICE O/P EST LOW 20 MIN: CPT | Performed by: ORTHOPAEDIC SURGERY

## 2019-08-06 RX ORDER — CYCLOBENZAPRINE HCL 10 MG
10 TABLET ORAL
COMMUNITY

## 2019-08-06 RX ORDER — CHOLECALCIFEROL (VITAMIN D3) 1250 MCG
CAPSULE ORAL
COMMUNITY

## 2019-08-06 RX ORDER — HYDROXYZINE 50 MG/1
50 TABLET, FILM COATED ORAL
COMMUNITY

## 2019-08-06 ASSESSMENT — ENCOUNTER SYMPTOMS
RESPIRATORY NEGATIVE: 1
GASTROINTESTINAL NEGATIVE: 1
ALLERGIC/IMMUNOLOGIC NEGATIVE: 1

## 2019-08-06 NOTE — PROGRESS NOTES
Review of Systems   Constitutional: Negative. HENT:        Thyroid   Respiratory: Negative. Cardiovascular: Negative. Gastrointestinal: Negative. Endocrine: Negative. Genitourinary: Negative. Musculoskeletal: Negative. Skin: Negative. Allergic/Immunologic: Negative. Neurological: Negative. Hematological: Negative.     Psychiatric/Behavioral:        Depression or other problems